# Patient Record
Sex: MALE | Race: WHITE | NOT HISPANIC OR LATINO | Employment: UNEMPLOYED | URBAN - METROPOLITAN AREA
[De-identification: names, ages, dates, MRNs, and addresses within clinical notes are randomized per-mention and may not be internally consistent; named-entity substitution may affect disease eponyms.]

---

## 2023-04-29 PROBLEM — F10.929 ALCOHOL INTOXICATION: Status: ACTIVE | Noted: 2023-04-29

## 2023-04-29 PROBLEM — Z72.0 TOBACCO ABUSE: Status: ACTIVE | Noted: 2023-04-29

## 2023-04-29 PROBLEM — F10.20 ALCOHOL DEPENDENCE: Status: ACTIVE | Noted: 2023-04-29

## 2023-04-29 PROBLEM — I10 ESSENTIAL HYPERTENSION: Status: ACTIVE | Noted: 2023-04-29

## 2023-09-30 ENCOUNTER — NURSE TRIAGE (OUTPATIENT)
Dept: ADMINISTRATIVE | Facility: CLINIC | Age: 43
End: 2023-09-30
Payer: COMMERCIAL

## 2023-09-30 NOTE — TELEPHONE ENCOUNTER
"Patient c/o hematuria. Symptom started 6 days ago. Patient states that he was seen at [facility] 3 days ago and was diagnosed with a UTI. He was prescribed Cefuroxime 500 mg BID for 5 days. Patient started abx the same day. He is calling because last night his urine did not have any blood or clots but this morning he passed "pure blood" while urinating. Advised per protocol to be seen at  today. Patient verbalizes understanding. Advised the patient to call back with any further questions or if symptoms worsen.     Reason for Disposition   Passing pure blood or large blood clots (i.e., size > a dime)  (Exceptions: Flecks, small strands, or pinkish-red color)   Health Information question, no triage required and triager able to answer question    Additional Information   Negative: Shock suspected (e.g., cold/pale/clammy skin, too weak to stand, low BP, rapid pulse)   Negative: Sounds like a life-threatening emergency to the triager   Negative: [1] Unable to urinate (or only a few drops) > 4 hours AND [2] bladder feels very full (e.g., palpable bladder or strong urge to urinate)    Protocols used: Urinary Tract Infection on Antibiotic Follow-up Call - Male-A-, Information Only Call - No Triage-A-    "